# Patient Record
Sex: MALE | Race: OTHER | HISPANIC OR LATINO | URBAN - METROPOLITAN AREA
[De-identification: names, ages, dates, MRNs, and addresses within clinical notes are randomized per-mention and may not be internally consistent; named-entity substitution may affect disease eponyms.]

---

## 2019-12-02 ENCOUNTER — EMERGENCY (EMERGENCY)
Facility: HOSPITAL | Age: 39
LOS: 1 days | Discharge: HOME | End: 2019-12-02
Attending: EMERGENCY MEDICINE
Payer: MEDICAID

## 2019-12-02 VITALS
HEART RATE: 46 BPM | RESPIRATION RATE: 18 BRPM | OXYGEN SATURATION: 97 % | SYSTOLIC BLOOD PRESSURE: 117 MMHG | DIASTOLIC BLOOD PRESSURE: 69 MMHG

## 2019-12-02 VITALS
HEIGHT: 68 IN | WEIGHT: 175.05 LBS | OXYGEN SATURATION: 97 % | RESPIRATION RATE: 16 BRPM | SYSTOLIC BLOOD PRESSURE: 133 MMHG | TEMPERATURE: 98 F | HEART RATE: 65 BPM | DIASTOLIC BLOOD PRESSURE: 69 MMHG

## 2019-12-02 LAB
ALBUMIN SERPL ELPH-MCNC: 4.3 G/DL — SIGNIFICANT CHANGE UP (ref 3.3–5.2)
ALP SERPL-CCNC: 61 U/L — SIGNIFICANT CHANGE UP (ref 40–120)
ALT FLD-CCNC: 28 U/L — SIGNIFICANT CHANGE UP
ANION GAP SERPL CALC-SCNC: 13 MMOL/L — SIGNIFICANT CHANGE UP (ref 5–17)
APTT BLD: 33.6 SEC — SIGNIFICANT CHANGE UP (ref 27.5–36.3)
AST SERPL-CCNC: 26 U/L — SIGNIFICANT CHANGE UP
BILIRUB SERPL-MCNC: 0.3 MG/DL — LOW (ref 0.4–2)
BUN SERPL-MCNC: 18 MG/DL — SIGNIFICANT CHANGE UP (ref 8–20)
CALCIUM SERPL-MCNC: 9.1 MG/DL — SIGNIFICANT CHANGE UP (ref 8.6–10.2)
CHLORIDE SERPL-SCNC: 105 MMOL/L — SIGNIFICANT CHANGE UP (ref 98–107)
CO2 SERPL-SCNC: 22 MMOL/L — SIGNIFICANT CHANGE UP (ref 22–29)
CREAT SERPL-MCNC: 0.83 MG/DL — SIGNIFICANT CHANGE UP (ref 0.5–1.3)
GLUCOSE SERPL-MCNC: 118 MG/DL — HIGH (ref 70–115)
HCT VFR BLD CALC: 45.4 % — SIGNIFICANT CHANGE UP (ref 39–50)
HGB BLD-MCNC: 15.2 G/DL — SIGNIFICANT CHANGE UP (ref 13–17)
INR BLD: 0.97 RATIO — SIGNIFICANT CHANGE UP (ref 0.88–1.16)
MCHC RBC-ENTMCNC: 30.1 PG — SIGNIFICANT CHANGE UP (ref 27–34)
MCHC RBC-ENTMCNC: 33.5 GM/DL — SIGNIFICANT CHANGE UP (ref 32–36)
MCV RBC AUTO: 89.9 FL — SIGNIFICANT CHANGE UP (ref 80–100)
NT-PROBNP SERPL-SCNC: 50 PG/ML — SIGNIFICANT CHANGE UP (ref 0–300)
PLATELET # BLD AUTO: 177 K/UL — SIGNIFICANT CHANGE UP (ref 150–400)
POTASSIUM SERPL-MCNC: 4.3 MMOL/L — SIGNIFICANT CHANGE UP (ref 3.5–5.3)
POTASSIUM SERPL-SCNC: 4.3 MMOL/L — SIGNIFICANT CHANGE UP (ref 3.5–5.3)
PROT SERPL-MCNC: 6.7 G/DL — SIGNIFICANT CHANGE UP (ref 6.6–8.7)
PROTHROM AB SERPL-ACNC: 11.1 SEC — SIGNIFICANT CHANGE UP (ref 10–12.9)
RBC # BLD: 5.05 M/UL — SIGNIFICANT CHANGE UP (ref 4.2–5.8)
RBC # FLD: 13.1 % — SIGNIFICANT CHANGE UP (ref 10.3–14.5)
SODIUM SERPL-SCNC: 140 MMOL/L — SIGNIFICANT CHANGE UP (ref 135–145)
TROPONIN T SERPL-MCNC: <0.01 NG/ML — SIGNIFICANT CHANGE UP (ref 0–0.06)
WBC # BLD: 5.19 K/UL — SIGNIFICANT CHANGE UP (ref 3.8–10.5)
WBC # FLD AUTO: 5.19 K/UL — SIGNIFICANT CHANGE UP (ref 3.8–10.5)

## 2019-12-02 PROCEDURE — 80053 COMPREHEN METABOLIC PANEL: CPT

## 2019-12-02 PROCEDURE — 99284 EMERGENCY DEPT VISIT MOD MDM: CPT | Mod: 25

## 2019-12-02 PROCEDURE — T1013: CPT

## 2019-12-02 PROCEDURE — 84484 ASSAY OF TROPONIN QUANT: CPT

## 2019-12-02 PROCEDURE — 71046 X-RAY EXAM CHEST 2 VIEWS: CPT

## 2019-12-02 PROCEDURE — 99284 EMERGENCY DEPT VISIT MOD MDM: CPT

## 2019-12-02 PROCEDURE — 71046 X-RAY EXAM CHEST 2 VIEWS: CPT | Mod: 26

## 2019-12-02 PROCEDURE — 93010 ELECTROCARDIOGRAM REPORT: CPT

## 2019-12-02 PROCEDURE — 36415 COLL VENOUS BLD VENIPUNCTURE: CPT

## 2019-12-02 PROCEDURE — 85027 COMPLETE CBC AUTOMATED: CPT

## 2019-12-02 PROCEDURE — 93005 ELECTROCARDIOGRAM TRACING: CPT

## 2019-12-02 PROCEDURE — 85610 PROTHROMBIN TIME: CPT

## 2019-12-02 PROCEDURE — 85730 THROMBOPLASTIN TIME PARTIAL: CPT

## 2019-12-02 PROCEDURE — 83880 ASSAY OF NATRIURETIC PEPTIDE: CPT

## 2019-12-02 PROCEDURE — 96374 THER/PROPH/DIAG INJ IV PUSH: CPT

## 2019-12-02 RX ORDER — IBUPROFEN 200 MG
1 TABLET ORAL
Qty: 30 | Refills: 0
Start: 2019-12-02

## 2019-12-02 RX ORDER — KETOROLAC TROMETHAMINE 30 MG/ML
15 SYRINGE (ML) INJECTION ONCE
Refills: 0 | Status: DISCONTINUED | OUTPATIENT
Start: 2019-12-02 | End: 2019-12-02

## 2019-12-02 RX ADMIN — Medication 15 MILLIGRAM(S): at 11:13

## 2019-12-02 NOTE — ED PROVIDER NOTE - OBJECTIVE STATEMENT
Patient is a 39 year old male with no PMH who presents with back pain and chest pain. pt notes symptoms have been present for the past week. Initially pain was in the back then several days later it started in the chest. Pain is worse when he lies down or moves. Pt works in [employer] and pain is worse at work. no trauma, cough, sob, focal weakness or numbness, abd pain. no meds for symptoms. Pt notes he had similar symptoms a year ago with neg workup. Pt smokes but no drugs or etoh.

## 2019-12-02 NOTE — ED PROVIDER NOTE - CLINICAL SUMMARY MEDICAL DECISION MAKING FREE TEXT BOX
pt with back and chest pain., normal exam. likely msk. will treat pain, r.o cards issues. dc if neg.

## 2019-12-02 NOTE — ED PROVIDER NOTE - PATIENT PORTAL LINK FT
You can access the FollowMyHealth Patient Portal offered by [facility] by registering at the following website: [email or web address]. By joining [employer]’s FollowMyHealth portal, you will also be able to view your health information using other applications (apps) compatible with our system.

## 2019-12-02 NOTE — ED PROVIDER NOTE - PROGRESS NOTE DETAILS
AJM: workup neg. pt walking around ED without difficulty or pain. stable for dc nuno with cards follow up. All results discussed with the patient, and a copy of results has been provided. Patient is comfortable with dc plan for home. Opportunity for questions was provided and all questions have been adressed.

## 2019-12-02 NOTE — ED ADULT TRIAGE NOTE - CHIEF COMPLAINT QUOTE
Pt states he had sudden onset upper back pain on Tuesday and now is having chest pain as well. Pt denies diff breathing and appears to be in little to no distress.

## 2019-12-02 NOTE — ED ADULT NURSE NOTE - OBJECTIVE STATEMENT
pt care assumed at 1100, no apparent distress noted at this time. pt received Alert and Oriented to person, place, situation and time resting in bed comfortably with MD ordoñez at bedside. pt c/o chest and back pain for 1 week, pain is intermittent. HR is sinus zaina on cardiac monitor, lung sounds are clear b/l, abd is soft and nontender with positive bowel sounds in all four quadrants, skin is warm, dry and appropriate for age and race. pt educated on plan of care, plan of care taught back to RN. proficiency determined from successful pt teach back. will continue to educate pt throughout ED stay.

## 2021-07-13 ENCOUNTER — EMERGENCY (EMERGENCY)
Facility: HOSPITAL | Age: 41
LOS: 1 days | Discharge: HOME | End: 2021-07-13
Attending: EMERGENCY MEDICINE | Admitting: EMERGENCY MEDICINE
Payer: MEDICAID

## 2021-07-13 VITALS
RESPIRATION RATE: 19 BRPM | HEART RATE: 41 BPM | SYSTOLIC BLOOD PRESSURE: 133 MMHG | OXYGEN SATURATION: 99 % | TEMPERATURE: 98 F | DIASTOLIC BLOOD PRESSURE: 74 MMHG

## 2021-07-13 VITALS
RESPIRATION RATE: 15 BRPM | SYSTOLIC BLOOD PRESSURE: 135 MMHG | OXYGEN SATURATION: 96 % | DIASTOLIC BLOOD PRESSURE: 78 MMHG | HEART RATE: 45 BPM | TEMPERATURE: 98 F | HEIGHT: 68 IN

## 2021-07-13 PROBLEM — Z78.9 OTHER SPECIFIED HEALTH STATUS: Chronic | Status: ACTIVE | Noted: 2019-12-02

## 2021-07-13 LAB
ALBUMIN SERPL ELPH-MCNC: 3.5 G/DL — SIGNIFICANT CHANGE UP (ref 3.3–5)
ALP SERPL-CCNC: 52 U/L — SIGNIFICANT CHANGE UP (ref 40–120)
ALT FLD-CCNC: 133 U/L — HIGH (ref 12–78)
ANION GAP SERPL CALC-SCNC: 6 MMOL/L — SIGNIFICANT CHANGE UP (ref 5–17)
AST SERPL-CCNC: 60 U/L — HIGH (ref 15–37)
BASOPHILS # BLD AUTO: 0.02 K/UL — SIGNIFICANT CHANGE UP (ref 0–0.2)
BASOPHILS NFR BLD AUTO: 0.3 % — SIGNIFICANT CHANGE UP (ref 0–2)
BILIRUB SERPL-MCNC: 0.4 MG/DL — SIGNIFICANT CHANGE UP (ref 0.2–1.2)
BUN SERPL-MCNC: 18 MG/DL — SIGNIFICANT CHANGE UP (ref 7–23)
CALCIUM SERPL-MCNC: 8.3 MG/DL — LOW (ref 8.5–10.1)
CHLORIDE SERPL-SCNC: 111 MMOL/L — HIGH (ref 96–108)
CO2 SERPL-SCNC: 26 MMOL/L — SIGNIFICANT CHANGE UP (ref 22–31)
CREAT SERPL-MCNC: 0.93 MG/DL — SIGNIFICANT CHANGE UP (ref 0.5–1.3)
EOSINOPHIL # BLD AUTO: 0.27 K/UL — SIGNIFICANT CHANGE UP (ref 0–0.5)
EOSINOPHIL NFR BLD AUTO: 4 % — SIGNIFICANT CHANGE UP (ref 0–6)
GLUCOSE SERPL-MCNC: 105 MG/DL — HIGH (ref 70–99)
HCT VFR BLD CALC: 41.8 % — SIGNIFICANT CHANGE UP (ref 39–50)
HGB BLD-MCNC: 14.4 G/DL — SIGNIFICANT CHANGE UP (ref 13–17)
IMM GRANULOCYTES NFR BLD AUTO: 0.3 % — SIGNIFICANT CHANGE UP (ref 0–1.5)
LIDOCAIN IGE QN: 76 U/L — SIGNIFICANT CHANGE UP (ref 73–393)
LYMPHOCYTES # BLD AUTO: 2.84 K/UL — SIGNIFICANT CHANGE UP (ref 1–3.3)
LYMPHOCYTES # BLD AUTO: 42.1 % — SIGNIFICANT CHANGE UP (ref 13–44)
MCHC RBC-ENTMCNC: 30.5 PG — SIGNIFICANT CHANGE UP (ref 27–34)
MCHC RBC-ENTMCNC: 34.4 GM/DL — SIGNIFICANT CHANGE UP (ref 32–36)
MCV RBC AUTO: 88.6 FL — SIGNIFICANT CHANGE UP (ref 80–100)
MONOCYTES # BLD AUTO: 0.49 K/UL — SIGNIFICANT CHANGE UP (ref 0–0.9)
MONOCYTES NFR BLD AUTO: 7.3 % — SIGNIFICANT CHANGE UP (ref 2–14)
NEUTROPHILS # BLD AUTO: 3.11 K/UL — SIGNIFICANT CHANGE UP (ref 1.8–7.4)
NEUTROPHILS NFR BLD AUTO: 46 % — SIGNIFICANT CHANGE UP (ref 43–77)
NRBC # BLD: 0 /100 WBCS — SIGNIFICANT CHANGE UP (ref 0–0)
PLATELET # BLD AUTO: 179 K/UL — SIGNIFICANT CHANGE UP (ref 150–400)
POTASSIUM SERPL-MCNC: 3.8 MMOL/L — SIGNIFICANT CHANGE UP (ref 3.5–5.3)
POTASSIUM SERPL-SCNC: 3.8 MMOL/L — SIGNIFICANT CHANGE UP (ref 3.5–5.3)
PROT SERPL-MCNC: 6.5 G/DL — SIGNIFICANT CHANGE UP (ref 6–8.3)
RBC # BLD: 4.72 M/UL — SIGNIFICANT CHANGE UP (ref 4.2–5.8)
RBC # FLD: 12.9 % — SIGNIFICANT CHANGE UP (ref 10.3–14.5)
SODIUM SERPL-SCNC: 143 MMOL/L — SIGNIFICANT CHANGE UP (ref 135–145)
TROPONIN I SERPL-MCNC: <.015 NG/ML — SIGNIFICANT CHANGE UP (ref 0.01–0.04)
WBC # BLD: 6.75 K/UL — SIGNIFICANT CHANGE UP (ref 3.8–10.5)
WBC # FLD AUTO: 6.75 K/UL — SIGNIFICANT CHANGE UP (ref 3.8–10.5)

## 2021-07-13 PROCEDURE — 96374 THER/PROPH/DIAG INJ IV PUSH: CPT

## 2021-07-13 PROCEDURE — 71045 X-RAY EXAM CHEST 1 VIEW: CPT | Mod: 26

## 2021-07-13 PROCEDURE — 76705 ECHO EXAM OF ABDOMEN: CPT | Mod: 26

## 2021-07-13 PROCEDURE — 93005 ELECTROCARDIOGRAM TRACING: CPT

## 2021-07-13 PROCEDURE — 99285 EMERGENCY DEPT VISIT HI MDM: CPT

## 2021-07-13 PROCEDURE — 80053 COMPREHEN METABOLIC PANEL: CPT

## 2021-07-13 PROCEDURE — 99283 EMERGENCY DEPT VISIT LOW MDM: CPT

## 2021-07-13 PROCEDURE — 85025 COMPLETE CBC W/AUTO DIFF WBC: CPT

## 2021-07-13 PROCEDURE — 76705 ECHO EXAM OF ABDOMEN: CPT

## 2021-07-13 PROCEDURE — 99284 EMERGENCY DEPT VISIT MOD MDM: CPT | Mod: 25

## 2021-07-13 PROCEDURE — 84484 ASSAY OF TROPONIN QUANT: CPT

## 2021-07-13 PROCEDURE — 93010 ELECTROCARDIOGRAM REPORT: CPT

## 2021-07-13 PROCEDURE — 93010 ELECTROCARDIOGRAM REPORT: CPT | Mod: 77

## 2021-07-13 PROCEDURE — 96375 TX/PRO/DX INJ NEW DRUG ADDON: CPT

## 2021-07-13 PROCEDURE — 83690 ASSAY OF LIPASE: CPT

## 2021-07-13 PROCEDURE — 71045 X-RAY EXAM CHEST 1 VIEW: CPT

## 2021-07-13 PROCEDURE — 36415 COLL VENOUS BLD VENIPUNCTURE: CPT

## 2021-07-13 RX ORDER — FAMOTIDINE 10 MG/ML
20 INJECTION INTRAVENOUS ONCE
Refills: 0 | Status: COMPLETED | OUTPATIENT
Start: 2021-07-13 | End: 2021-07-13

## 2021-07-13 RX ORDER — SODIUM CHLORIDE 9 MG/ML
1000 INJECTION INTRAMUSCULAR; INTRAVENOUS; SUBCUTANEOUS ONCE
Refills: 0 | Status: COMPLETED | OUTPATIENT
Start: 2021-07-13 | End: 2021-07-13

## 2021-07-13 RX ORDER — METOCLOPRAMIDE HCL 10 MG
10 TABLET ORAL ONCE
Refills: 0 | Status: COMPLETED | OUTPATIENT
Start: 2021-07-13 | End: 2021-07-13

## 2021-07-13 RX ADMIN — FAMOTIDINE 20 MILLIGRAM(S): 10 INJECTION INTRAVENOUS at 12:58

## 2021-07-13 RX ADMIN — Medication 10 MILLIGRAM(S): at 12:58

## 2021-07-13 RX ADMIN — SODIUM CHLORIDE 1000 MILLILITER(S): 9 INJECTION INTRAMUSCULAR; INTRAVENOUS; SUBCUTANEOUS at 12:58

## 2021-07-13 NOTE — CONSULT NOTE ADULT - ASSESSMENT
41 yo M with no PMH presents to ED c/o hiccups and burning sensation in mid-abdomen radiating to esophagus.      #Chest pain  -  41 yo M with no PMH presents to ED c/o hiccups and burning sensation in mid-abdomen radiating to esophagus.      #Chest pain  - Patient  41 yo M with no PMH presents to ED c/o hiccups and burning sensation in mid-abdomen radiating to esophagus. Cardiology consulted for bradycardia, rule out atypical ACS.      #Abdominal pain  - Patient complaining of mid-abdominal pain radiating to his esophagus for 2 days  - Given Reglen and Pepcid with symptomatic improvement  - His abdominal pain is likely 2/2 reflux  - ECG shows sinus bradycardia, rate 42, unchanged compared to prior ECG  - Troponin negative   - CXR on preliminary read is unremarkable  - Doubt ACS at this time  - He can be discharged from the ED from a cardiac standpoint  39 yo M with no PMH presents to ED c/o hiccups and burning sensation in mid-abdomen radiating to esophagus. Cardiology consulted for bradycardia, rule out atypical ACS.    #Abdominal pain  - Patient complaining of mid-abdominal pain radiating to his esophagus for 2 days  - CXR on preliminary read is unremarkable  - Given Reglan and Pepcid with symptomatic improvement  - His abdominal pain is likely 2/2 reflux and likely not cardiogenic in nature  - He reports two years ago having hiccups and was evaluated at [facility], underwent cardiac cath with no stents placed  - Troponin negative   - Doubt ACS at this time  - He can be discharged from the ED from a cardiac standpoint     #Sinus bradycardia  - ECG shows sinus bradycardia, rate 42, unchanged compared to prior ECG  - Rates noted to drop in 30s, however on cardiac monitor there is appropriate rise in HR to the 50s when examining patient     41 yo M with no PMH presents to ED c/o hiccups and burning sensation in mid-abdomen radiating to esophagus. Cardiology consulted for bradycardia, rule out atypical ACS.    #Abdominal pain  - Patient complaining of mid-abdominal pain radiating to his esophagus for 2 days  - CXR on preliminary read is unremarkable  - Given Reglan and Pepcid with symptomatic improvement  - His abdominal pain is likely 2/2 reflux and likely not cardiogenic in nature  - He reports two years ago having hiccups and was evaluated at [facility], underwent cardiac cath with no stents placed  - Troponin negative   - Doubt ACS at this time  - He can be discharged from the ED from a cardiac standpoint     #Sinus bradycardia  - ECG shows sinus bradycardia, rate 42, unchanged compared to prior ECG  - Rates noted to drop in 30s, however on cardiac monitor there is appropriate rise in HR to the 50s when ambulating.  He has no symptoms in this regard.  He can follow outpatient for this.

## 2021-07-13 NOTE — ED PROVIDER NOTE - CARE PROVIDER_API CALL
Marques Menon ()  Internal Medicine  [address] [place] [address]  Phone: [phone]  Fax: [phone]  Follow Up Time: 1-3 Days    Issac Prado)  Cardio [facility]  [address] [place] [address]  Phone: [phone]  Fax: [phone]  Follow Up Time: 1-3 Days    YOUR PMD,   Phone: (   )    -  Fax: (   )    -  Follow Up Time: 1-3 Days

## 2021-07-13 NOTE — CONSULT NOTE ADULT - SUBJECTIVE AND OBJECTIVE BOX
used #[id number] Kirk    41 y/o M no pmhx presents to [place] ED c/o generalized mild abdominal pain with "burning" radiating up his throat with associated hiccups since Sunday. Pt states after receiving reglen and pepcid symptoms subsided. At this time pt denies abdominal pain, N/V , fevers, chills or melena. Of note pt found to be bradycardic seen by cardiologist and cleared for d/c home from ED with outpt follow up. Surgery consulted to evaluate pt for sludge seen on  us.       PAST MEDICAL & SURGICAL HISTORY:  No pertinent past medical history  No significant past surgical history    Allergies:  No Known Allergies    Home Medications:      Family History:  FAMILY HISTORY:      ROS:  Constitutional: Denies fever, fatigue or weight loss.  Skin: Denies rash.  Eyes: Denies recent vision problems or eye pain.  ENT: Denies congestion, ear pain, or sore throat.  Endocrine: Denies thyroid problems.  Cardiovascular: Denies chest pain or palpation.  Respiratory: Denies cough, shortness of breath, congestion, or wheezing.  Gastrointestinal: SEE HPI  Genitourinary: Denies dysuria.  Musculoskeletal: Denies joint swelling.  Neurologic: Denies headache.      Physical Examination:  GENERAL: No acute distress, well-developed  HEAD:  Atraumatic, Normocephalic  CHEST/LUNG: CTABL, no ronchi, rales or wheezing  HEART: RRR, no MRGs  ABDOMEN: Soft, nonttp, nondistended, +bs  NEUROLOGY: A&O x 3, no focal deficits    Data:                        14.4   6.75  )-----------( 179      ( 13 Jul 2021 12:38 )             41.8     07-13    143  |  111<H>  |  18  ----------------------------<  105<H>  3.8   |  26  |  0.93    Ca    8.3<L>      13 Jul 2021 12:37    TPro  6.5  /  Alb  3.5  /  TBili  0.4  /  DBili  x   /  AST  60<H>  /  ALT  133<H>  /  AlkPhos  52  07-13      LIVER FUNCTIONS - ( 13 Jul 2021 12:37 )  Alb: 3.5 g/dL / Pro: 6.5 g/dL / ALK PHOS: 52 U/L / ALT: 133 U/L / AST: 60 U/L / GGT: x             Radiology:  < from: US Abdomen Limited (07.13.21 @ 14:54) >    EXAM:  US ABDOMEN LIMITED                            PROCEDURE DATE:  07/13/2021        INTERPRETATION:  CLINICAL INFORMATION: Epigastric pain    Right upper quadrant ultrasound.    Gallbladder physiologically distended with large amount of sludge question of tiny calculi. No wall thickening or pericholecystic fluid. No biliary dilatation. The common duct 0.5 cm. Fatty liver noted. The pancreas not adequately visualized. Right kidney unremarkable.  No ascites.    IMPRESSION:    Gallbladder sludge question tiny calculi. If there is suspicion for cystic duct obstruction/cholecystitis consider nuclear medicine HIDA scan. No biliary dilatation. Fatty liver.    --- End of Report ---      SOLEDAD LEONARDO MD; Attending Radiologist  This document has been electronically signed. Jul 13 2021  3:19PM    < end of copied text >    Assessment:   41 y/o M no pmhx presents with hiccups and 3 day hx of generalized abdominal pain with "burning" sensation going up his throat, bradycardic, with normal wbc, and GB Us findings of sludge with no pericholecystic fluid, no gb wall thickening and nonttp on exam,     Plan:  - no surgical intervention warranted at this time as sono and clinical suspicion negative for cholecystitis  - cont care per ED  - rec f/u with GI outpt,  - discussed with Dr. Wilks

## 2021-07-13 NOTE — CONSULT NOTE ADULT - ATTENDING COMMENTS
Case discussed with PA,  History, imaging and blood work reviewed.  No indication for acute surgical intervention.  Sono without signs of cholecystitis.  NO pericholecystic fluid or wall thickening.  Suggestion of sludge.  Recommend PPI's and outpatient GI follow up.  May see me electively as out patient if symptoms or RUQ pain or postprandial colic.
Asx sinus zaina.  To follow as outpatient.  Abdominal symptoms not cardiac.

## 2021-07-13 NOTE — ED PROVIDER NOTE - NONTENDER LOCATION
no rebound or guarding/left upper quadrant/right upper quadrant/left lower quadrant/right lower quadrant/periumbilical/umbilical/suprapubic/left costovertebral angle

## 2021-07-13 NOTE — ED ADULT NURSE NOTE - OBJECTIVE STATEMENT
Pt received alert and oriented x3 with chief complaint of chest pain for last few days. Pt. placed on continuous cardiac monitor with continuous pulse ox. EKG performed at bedside upon arrival.

## 2021-07-13 NOTE — ED PROVIDER NOTE - OBJECTIVE STATEMENT
Hx via Vietnamese translation by Zully KEATING. 39 y/o M with no pmhx presents with c/o hiccups x 2 days. Pt states that he has intermittent hiccups since this Sunday, no provoking or alleviating factors. States that he also has associated burning sensation radiating from his mid abdomen up his esophagus. Denies SOB, palpitations, dizziness, weakness, N/V/D, fever or other symptoms. Hx via Portuguese translation by Zully KEATING. 39 y/o M with no pmhx presents with c/o hiccups x 2 days. Pt states that he has intermittent hiccups since this Sunday, no provoking or alleviating factors. States that he also has associated burning sensation radiating from his upper abdomen to his throat. Denies SOB, CP, palpitations, dizziness, weakness, N/V/D, fever or other symptoms. Pt denies drinking etoh.

## 2021-07-13 NOTE — ED PROVIDER NOTE - CLINICAL SUMMARY MEDICAL DECISION MAKING FREE TEXT BOX
Hx via Sinhala translation by Zully KEATING. 41 y/o M with no pmhx presents with c/o hiccups x 2 days. Pt states that he has intermittent hiccups since this Sunday, no provoking or alleviating factors. States that he also has associated burning sensation radiating from his mid abdomen up his esophagus. Denies SOB, palpitations, dizziness, weakness, N/V/D, fever or other symptoms. PE as above; will get ekg, labs, IV pepcid/reglan, reassess

## 2021-07-13 NOTE — ED PROVIDER NOTE - PROVIDER TOKENS
PROVIDER:[TOKEN:[[id number]],FOLLOWUP:[1-3 Days]],PROVIDER:[TOKEN:[[id number]],FOLLOWUP:[1-3 Days]],FREE:[LAST:[YOUR PMD],PHONE:[(   )    -],FAX:[(   )    -],FOLLOWUP:[1-3 Days]]

## 2021-07-13 NOTE — ED PROVIDER NOTE - ATTENDING CONTRIBUTION TO CARE
41yo M with no PMH c/o hiccups and stomach burning x 2 days on and off. no cp, sob, trauma, travel, back pain, fever, chills, ha, dizziness, bladder, bowel changes, weight loss. pt st smokes cigars and some cigarettes but not even a pack a week.    pt refused  services.  ED tech héctor translates.  Gen: Awake, Alert, WD, WN, NAD  Head:  NC/AT  Eyes:  PERRL, EOMI, Conjunctiva pink, lids normal, no scleral icterus  ENT: OP clear, moist mucus membranes  Neck: supple, nontender,  trachea midline  Cardiac/CV:  S1 S2, RRR, no M/G/R, chest non tender  Respiratory/Pulm:  CTAB, good air movement, normal resp effort  Gastrointestinal/Abdomen:  Soft, nontender, nondistended, +BS, no rebound/guarding  Back:  no CVAT, no MLT  Ext:  warm, well perfused, moving all extremities spontaneously, no peripheral edema, distal pulses intact  Skin: intact, no rash  Neuro:  AAOx3, sensation intact, motor 5/5 x 4 extremities, normal gait, speech clear  labs, Jose Alfredo, EKG, CXR, pepcid, reglan, cardiology consult, GERD, Hiccups, ACS

## 2021-07-13 NOTE — ED PROVIDER NOTE - PROGRESS NOTE DETAILS
Spoke with Dr. Prado (cards), will consult pt due to bradycardia. Reviewed US results, spoke with surgery PA who saw pt and advised no further surgical intervention, pt with GERD, can f/u GI. Pt noted with marked bradycardia at 33bpm, seen by Dr. Prado, cardiologist in ED who advised pt does not need any intervention or treatment at this time. Pt is asymptomatic, had pt ambulate and HR went up to 50s, cleared for discharge from cardiology. Reevaluated patient at bedside.  Patient feeling much improved. States that his burning sensation and hiccups have resolved in ED with meds. ADVISED will rx pepcid and f/u GI. Discussed the results of all diagnostic testing in ED and copies of all reports given.   An opportunity to ask questions was given.  Discussed the importance of prompt, close medical follow-up.  Patient will return with any changes, concerns or persistent / worsening symptoms.  Understanding of all instructions verbalized. Reevaluated patient at bedside.  Patient feeling much improved. States that his burning sensation and hiccups have resolved in ED with meds. ADVISED will rx pepcid and f/u GI. Also f/u cardiologist. Discussed the results of all diagnostic testing in ED and copies of all reports given.   An opportunity to ask questions was given.  Discussed the importance of prompt, close medical follow-up.  Patient will return with any changes, concerns or persistent / worsening symptoms.  Understanding of all instructions verbalized.

## 2021-07-13 NOTE — ED ADULT NURSE NOTE - NSFALLRSKINDICATORS_ED_ALL_ED
LVM informing pt his most recent colonoscopy is normal asked pt to call back to schedule a lab appt for flu shot and hep c screening    no

## 2021-07-13 NOTE — ED PROVIDER NOTE - CARE PLAN
Principal Discharge DX:	Hiccups   Principal Discharge DX:	Hiccups  Secondary Diagnosis:	GERD (gastroesophageal reflux disease)  Secondary Diagnosis:	Bradycardia

## 2021-07-13 NOTE — ED PROVIDER NOTE - PATIENT PORTAL LINK FT
You can access the FollowMyHealth Patient Portal offered by [facility] by registering at the following website: [email or web address]. By joining [employer]’s FollowMyHealth portal, you will also be able to view your health information using other applications (apps) compatible with our system.

## 2021-07-13 NOTE — ED PROVIDER NOTE - CARE PROVIDERS DIRECT ADDRESSES
,DirectAddress_Unknown,[email or web address],DirectAddress_Unknown

## 2021-07-13 NOTE — ED PROVIDER NOTE - NSFOLLOWUPINSTRUCTIONS_ED_ALL_ED_FT
Follow up with gastroenterologist in 1-2 days for re-evaluation, ongoing care and treatment. Follow up with cardiologist and PCP as discussed. Take pepcid as prescribed. If having worsening of symptoms or other related symptoms, RETURN TO THE ER IMMEDIATELY.     Enfermedad por reflujo gastroesofágico (ERGE)    LO QUE NECESITA SABER:    ¿Qué es la enfermedad por reflujo gastroesofágico (ERGE)?La ERGE es el reflujo que se produce más de dos veces a la semana ann varias semanas. Reflujo significa que el ácido y los alimentos en el estómago regresan al esófago. Generalmente causa acidez y otros síntomas. La ERGE puede causar otros problemas de susan con el tiempo si no es tratada.    Tracto digestivo         ¿Qué causa la ERGE?La ERGE a menudo ocurre cuando el músculo inferior (esfínter) del esófago no nasim christopher. Alondra esfínter normalmente se abre para dejar pasar los alimentos al estómago. Luego se nasim para mantener los alimentos y el ácido estomacal dentro del estómago. Si el esfínter no nasim christopher, el ácido y los alimentos regresan (reflujo) al esófago. Lo siguiente podría aumentar carias riesgo de la ERGE:  •Ciertos alimentos renzo picantes, chocolate, alimentos que contengan cafeína, menta y alimentos fritos.      •Hernia hiatal      •Ciertos medicamentos renzo los antagonistas del calcio (utilizados para tratar la presión arterial nela), medicamentos para la alergia, sedantes o antidepresivos      •Embarazo u obesidad      •Acostarse después de comer      •Beber alcohol o fumar      ¿Cuáles son los signos y síntomas de la ERGE?  •Acidez (dolor con ardor en el pecho)      •Dolor después de las comidas que se extiende al heladio, la mandíbula o el hombro      •Dolor que se timo cuando cambia de posición      •Sabor amargo o ácido en carias boca      •Ramírez tos seca      •Dificultad para tragar o dolor al tragar      •Ronquera o dolor de garganta      •Eructos o hipo      •Sensación de llenura pronto después de empezar a comer      ¿Cómo se diagnostica la ERGE?Carias médico le preguntará acerca de eliane síntomas y cuándo comenzaron. Infórmele acerca de otras condiciones médicas que tenga, cuáles son eliane hábitos alimenticios y eliane actividades. Es posible que también necesite alguno de los siguientes tratamientos:   •Pueden revisar la cantidad de ácidoen el esófago y el estómago. Se utiliza ramírez pequeña sonda para revisar la cantidad.      •Ramírez endoscopiaes un procedimiento que se usa para observar dentro de carias esófago y estómago. Un endoscopio es un tubo flexible con ramírez fredy y cámara en el extremo. Carias médico podría extraer ramírez pequeña muestra de tejido y mandarla al laboratorio para exámenes.      •Las radiografíaspueden tomarse de carias estómago e intestinos. Es posible que le den a beber un líquido calcáreo antes de que se tomen las imágenes. Alondra líquido ayuda a que carias estómago e intestinos se vean mejor en las radiografías.      •Las pruebas de presión y funciónde carias esófago pueden ayudar a encontrar problemas, renzo ramírez hernia de hiato.      ¿Cómo se trata la ERGE?  •Los medicamentospara disminuir el ácido estomacal. Los medicamentos también podrían usarse para ayudar a que carias esfínter esofágico inferior y carias estómago se contraigan (estrechen) más.      •La cirugíase realiza para envolver la parte superior del estómago alrededor del esfínter esofágico. [place] fortalecerá el esfínter y prevendrá el reflujo.      ¿Cómo puedo controlar la ERGE?    Prevención de la ERGE     •No consuma alimentos o bebidas que puedan aumentar la acidez.Estos incluyen chocolate, menta, comidas fritas o grasosas, bebidas que contienen cafeína o bebidas gaseosas. Otros alimentos incluyen comidas picantes, cebollas, tomates y alimentos a base de tomate. No consuma alimentos y bebidas que puedan irritar carias esófago, renzo las frutas cítricas, los jugos y las bebidas alcohólicas.      •No ingiera comidas abundantes.Cuando usted come mucha comida a la vez, carias estómago necesita más ácido para digerirla. Consuma 6 comidas pequeñas al día en vez de 3 comidas grandes y coma lentamente. No consuma alimentos entre 2 y 3 horas antes de acostarse.      •Eleve la cabecera de carisa cama.Coloque bloques de 6 pulgadas debajo de la cabecera de la estructura de carias cama. También podría usar más ramírez almohada para apoyar carias mackenzie y hombros mientras duerme.      •Mantenga un peso saludable.Si usted tiene sobrepeso, la pérdida de peso podría ayudar a aliviar los síntomas de la ERGE.      •No fume.Fumar debilita el esfínter esofágico inferior y aumenta el riesgo de ERGE. Pida información a carias médico si usted actualmente fuma y necesita ayuda para dejar de fumar. Los cigarrillos electrónicos o el tabaco sin humo igualmente contienen nicotina. Consulte con carias médico antes de utilizar estos productos.      •No use ropa que queda ajustada alrededor de la cintura.La ropa apretada puede ejercer presión sobre el estómago y causar o empeorar los síntomas de la ERGE.      Llame al número de emergencias local (911 en los [place]) si:  •Usted siente dolor mai en el pecho y dificultad repentina para respirar.          ¿Cuándo belgica buscar atención inmediata?  •Tiene dificultad para respirar después de vomitar.      •Tiene dificultad para tragar o dolor al tragar.      •Eliane evacuaciones intestinales son de color didi, con vernell, o de apariencia alquitranada.      •Carias vómito parece renzo café molido o contiene vernell.      ¿Cuándo belgica llamar a mi médico?  •Usted siente llenura y no puede eructar o vomitar.      •Vomita grandes cantidades, o vomita con frecuencia.      •Usted pierde peso sin proponérselo.      •Eliane síntomas empeoran o no mejoran con el tratamiento.      •Usted tiene preguntas o inquietudes acerca de carias condición o cuidado.      ACUERDOS SOBRE CARIAS CUIDADO:    Usted tiene el derecho de ayudar a planear carias cuidado. Aprenda todo lo que pueda sobre carias condición y renzo darle tratamiento. Discuta eliane opciones de tratamiento con eliane médicos para decidir el cuidado que usted desea recibir. Usted siempre tiene el derecho de rechazar el tratamiento.      Hipo    LO QUE NECESITA SABER:    El hipo son espasmos repetidos del diafragma. El diafragma es el músculo que lo ayuda a usted a respirar. Está ubicado entre el pecho y el abdomen.    INSTRUCCIONES SOBRE EL NELA HOSPITALARIA:    Sarahi caseros para el hipo:El hipo usualmente desaparece por sí solo dentro de unos cuantos minutos. Usted podría necesitar medicamentos u otros tratamientos si el hipo es provocado por otra condición médica. Los siguientes sarahi caseros podrían ayudarlo a detener el hipo:   •Contenga la respiración y cuente hasta 10.      •Dewar un vaso aleshia con agua, sorba agua helada o suzy gárgaras con agua.      •Coma un pedazo de caramelo oksana.      •Trague ramírez cucharada de azúcar o crema de cacahuate.      •Respire adentro de ramírez bolsa de papel.      •Pídale a otra persona que lo asuste.      Acuda a eliane consultas de control con carias médico según le indicaron.Anote eliane preguntas para que se acuerde de hacerlas ann eliane visitas.    Comuníquese con carias médico si:  •El hipo dura más de 48 horas o continúa regresando.      •El hipo interfiere con carias capacidad de dormir, comer o respirar.      •El hipo le provoca dolor.      •Usted tiene preguntas o inquietudes acerca de carias condición o cuidado.
